# Patient Record
Sex: FEMALE | URBAN - METROPOLITAN AREA
[De-identification: names, ages, dates, MRNs, and addresses within clinical notes are randomized per-mention and may not be internally consistent; named-entity substitution may affect disease eponyms.]

---

## 2020-05-06 NOTE — PATIENT DISCUSSION
NON-LASIK CANDIDATE 2' NO VISUAL COMPLAINTS. PT IS MYOPIC AND WORKS IN NAIL SALON; DISC RESULTS OF CORRECTING FOR DISTANCE WOULD NOT BENEFIT HER DURING WORK. RTC PRN.

## 2020-10-27 ENCOUNTER — IMPORTED ENCOUNTER (OUTPATIENT)
Dept: URBAN - METROPOLITAN AREA CLINIC 9 | Facility: CLINIC | Age: 55
End: 2020-10-27

## 2020-11-05 ENCOUNTER — IMPORTED ENCOUNTER (OUTPATIENT)
Dept: URBAN - METROPOLITAN AREA CLINIC 9 | Facility: CLINIC | Age: 55
End: 2020-11-05

## 2021-02-12 ENCOUNTER — IMPORTED ENCOUNTER (OUTPATIENT)
Dept: URBAN - METROPOLITAN AREA CLINIC 9 | Facility: CLINIC | Age: 56
End: 2021-02-12

## 2021-10-16 ASSESSMENT — TONOMETRY
OD_IOP_MMHG: 18
OS_IOP_MMHG: 17
OD_IOP_MMHG: 13
OS_IOP_MMHG: 15
OD_IOP_MMHG: 20
OS_IOP_MMHG: 21

## 2021-10-16 ASSESSMENT — VISUAL ACUITY
OS_CC: 20/20 SN
OS_SC: 20/40 SN
OD_CC: 20/30 + SN
OS_CC: 20/60 SN
OS_CC: 20/15 SN
OD_SC: 20/30 SN
OD_CC: 20/20 SN

## 2022-06-21 RX ORDER — OSELTAMIVIR PHOSPHATE 75 MG/1
1 CAPSULE ORAL
COMMUNITY
Start: 2017-02-05 | End: 2022-08-24 | Stop reason: CLARIF

## 2022-06-21 RX ORDER — ONDANSETRON 4 MG/1
TABLET, ORALLY DISINTEGRATING ORAL
COMMUNITY
Start: 2019-04-11 | End: 2022-08-24 | Stop reason: CLARIF

## 2022-06-21 RX ORDER — BUTALBITAL, ACETAMINOPHEN AND CAFFEINE 50; 325; 40 MG/1; MG/1; MG/1
TABLET ORAL
COMMUNITY
Start: 2021-07-15

## 2022-06-21 RX ORDER — ONDANSETRON HYDROCHLORIDE 8 MG/1
TABLET, FILM COATED ORAL
COMMUNITY
Start: 2017-11-01 | End: 2022-08-24 | Stop reason: CLARIF

## 2022-06-21 RX ORDER — OMEPRAZOLE 40 MG/1
CAPSULE, DELAYED RELEASE ORAL
COMMUNITY
End: 2022-08-24 | Stop reason: CLARIF

## 2022-06-21 RX ORDER — IBUPROFEN 800 MG/1
TABLET ORAL
COMMUNITY

## 2022-06-21 RX ORDER — TRIAMCINOLONE ACETONIDE 0.25 MG/G
CREAM TOPICAL
COMMUNITY
Start: 2019-08-19 | End: 2022-08-24 | Stop reason: CLARIF

## 2022-11-22 ENCOUNTER — COMPREHENSIVE EXAM (OUTPATIENT)
Dept: URBAN - METROPOLITAN AREA CLINIC 16 | Facility: CLINIC | Age: 57
End: 2022-11-22

## 2022-11-22 DIAGNOSIS — H25.13: ICD-10-CM

## 2022-11-22 DIAGNOSIS — Z01.00: ICD-10-CM

## 2022-11-22 DIAGNOSIS — H52.12: ICD-10-CM

## 2022-11-22 DIAGNOSIS — H52.223: ICD-10-CM

## 2022-11-22 DIAGNOSIS — H52.01: ICD-10-CM

## 2022-11-22 DIAGNOSIS — H16.143: ICD-10-CM

## 2022-11-22 DIAGNOSIS — H52.4: ICD-10-CM

## 2022-11-22 PROCEDURE — 92015 DETERMINE REFRACTIVE STATE: CPT

## 2022-11-22 PROCEDURE — 92014 COMPRE OPH EXAM EST PT 1/>: CPT

## 2022-11-22 ASSESSMENT — KERATOMETRY
OS_K1POWER_DIOPTERS: 43.50
OD_AXISANGLE2_DEGREES: 85
OD_K2POWER_DIOPTERS: 44.00
OD_AXISANGLE_DEGREES: 175
OS_AXISANGLE2_DEGREES: 106
OD_K1POWER_DIOPTERS: 43.50
OS_AXISANGLE_DEGREES: 16
OS_K2POWER_DIOPTERS: 43.75

## 2022-11-22 ASSESSMENT — VISUAL ACUITY
OS_SC: 20/40-2
OD_SC: 20/25-2
OU_SC: 20/30-1

## 2022-11-22 ASSESSMENT — TONOMETRY
OS_IOP_MMHG: 18
OD_IOP_MMHG: 14